# Patient Record
Sex: FEMALE | Race: BLACK OR AFRICAN AMERICAN | NOT HISPANIC OR LATINO | Employment: FULL TIME | URBAN - METROPOLITAN AREA
[De-identification: names, ages, dates, MRNs, and addresses within clinical notes are randomized per-mention and may not be internally consistent; named-entity substitution may affect disease eponyms.]

---

## 2023-12-05 ENCOUNTER — OFFICE VISIT (OUTPATIENT)
Dept: URGENT CARE | Facility: CLINIC | Age: 43
End: 2023-12-05
Payer: COMMERCIAL

## 2023-12-05 VITALS
BODY MASS INDEX: 37.46 KG/M2 | HEART RATE: 107 BPM | SYSTOLIC BLOOD PRESSURE: 132 MMHG | DIASTOLIC BLOOD PRESSURE: 93 MMHG | WEIGHT: 195 LBS | TEMPERATURE: 98.6 F | OXYGEN SATURATION: 97 %

## 2023-12-05 DIAGNOSIS — R11.2 NAUSEA AND VOMITING, UNSPECIFIED VOMITING TYPE: Primary | ICD-10-CM

## 2023-12-05 PROCEDURE — 87636 SARSCOV2 & INF A&B AMP PRB: CPT

## 2023-12-05 PROCEDURE — 99214 OFFICE O/P EST MOD 30 MIN: CPT | Performed by: PHYSICIAN ASSISTANT

## 2023-12-05 RX ORDER — NAPROXEN 500 MG/1
1 TABLET ORAL EVERY 12 HOURS PRN
COMMUNITY
Start: 2022-02-02

## 2023-12-05 RX ORDER — OMEPRAZOLE 20 MG/1
20 CAPSULE, DELAYED RELEASE ORAL DAILY
COMMUNITY

## 2023-12-05 RX ORDER — FLUCONAZOLE 150 MG/1
150 TABLET ORAL
COMMUNITY
Start: 2023-11-13 | End: 2023-12-05 | Stop reason: WASHOUT

## 2023-12-05 RX ORDER — MONTELUKAST SODIUM 10 MG/1
10 TABLET ORAL DAILY
COMMUNITY

## 2023-12-05 RX ORDER — MUPIROCIN 20 MG/G
1 OINTMENT TOPICAL EVERY 12 HOURS
COMMUNITY
Start: 2021-12-10

## 2023-12-05 RX ORDER — PSEUDOEPHEDRINE HCL 30 MG
30 TABLET ORAL
COMMUNITY
Start: 2023-02-19 | End: 2023-12-05 | Stop reason: WASHOUT

## 2023-12-05 RX ORDER — FUROSEMIDE 20 MG/1
20 TABLET ORAL DAILY
COMMUNITY

## 2023-12-05 RX ORDER — PRAVASTATIN SODIUM 10 MG/1
10 TABLET ORAL DAILY
COMMUNITY

## 2023-12-05 RX ORDER — HYDROXYZINE HYDROCHLORIDE 25 MG/1
25 TABLET, FILM COATED ORAL NIGHTLY PRN
COMMUNITY

## 2023-12-05 RX ORDER — DICYCLOMINE HYDROCHLORIDE 20 MG/1
20 TABLET ORAL
Qty: 28 TABLET | Refills: 0 | Status: SHIPPED | OUTPATIENT
Start: 2023-12-05 | End: 2023-12-12

## 2023-12-05 RX ORDER — TERBINAFINE HYDROCHLORIDE 250 MG/1
250 TABLET ORAL DAILY
COMMUNITY
Start: 2023-01-03

## 2023-12-05 RX ORDER — ONDANSETRON 4 MG/1
4 TABLET, FILM COATED ORAL EVERY 8 HOURS PRN
Qty: 21 TABLET | Refills: 0 | Status: SHIPPED | OUTPATIENT
Start: 2023-12-05 | End: 2023-12-12

## 2023-12-05 RX ORDER — POLYMYXIN B SULFATE AND TRIMETHOPRIM 1; 10000 MG/ML; [USP'U]/ML
1 SOLUTION OPHTHALMIC
COMMUNITY
Start: 2023-02-19 | End: 2023-12-05 | Stop reason: WASHOUT

## 2023-12-05 RX ORDER — MECLIZINE HYDROCHLORIDE 25 MG/1
1 TABLET ORAL EVERY 8 HOURS PRN
COMMUNITY
Start: 2023-10-24

## 2023-12-05 RX ORDER — FLUCONAZOLE 150 MG/1
150 TABLET ORAL
COMMUNITY
Start: 2022-08-30 | End: 2023-12-05 | Stop reason: WASHOUT

## 2023-12-05 RX ORDER — FLUTICASONE PROPIONATE 93 UG/1
SPRAY, METERED NASAL
COMMUNITY

## 2023-12-05 RX ORDER — GABAPENTIN 300 MG/1
1 CAPSULE ORAL EVERY 8 HOURS
COMMUNITY

## 2023-12-05 RX ORDER — PREDNISOLONE ACETATE 10 MG/ML
SUSPENSION/ DROPS OPHTHALMIC
COMMUNITY
Start: 2023-07-26 | End: 2023-12-05 | Stop reason: WASHOUT

## 2023-12-05 RX ORDER — GUAIFENESIN, PSEUDOEPHEDRINE HYDROCHLORIDE 600; 60 MG/1; MG/1
TABLET, EXTENDED RELEASE ORAL EVERY 12 HOURS
COMMUNITY
End: 2023-12-05 | Stop reason: WASHOUT

## 2023-12-05 NOTE — PROGRESS NOTES
"Subjective   Patient ID: Sarayh Castro is a 43 y.o. female.    Patient presents with loose stool, nausea, multiple episodes. States that her grandson was over at her house a few days ago and had N/V/D. Was primarily soft stool, but not watery. Her daughter, grandson's mother, has COVID. So she is unsure if her grandson has it -- Sx are different from her daughter's. Daughter is having URI Sx such as fever, chills, body aches, cough, congestion. Pt reports currently being on doxycycline r/t \"cysts.\" Sx have been ongoing x 24 hours for pt. She has had \"watery\" stool, no vomiting yet. States that stool color ranged from \"black to now being green.\" She has had approximately 12-13 loose Bms in the last 24 hours.         The following portions of the chart were reviewed this encounter and updated as appropriate:  Allergies  Meds  Problems  Med Hx  Surg Hx  Fam Hx         Review of Systems   All other systems reviewed and are negative.      Objective     Physical Exam  Constitutional:       General: She is awake.      Appearance: Normal appearance. She is well-developed.   HENT:      Head: Normocephalic and atraumatic.   Cardiovascular:      Rate and Rhythm: Normal rate.   Pulmonary:      Effort: Pulmonary effort is normal.   Abdominal:      General: Abdomen is flat. Bowel sounds are normal. There is distension (very mild).      Palpations: Abdomen is soft.      Tenderness: There is generalized abdominal tenderness. Negative signs include Duque's sign and McBurney's sign.      Hernia: No hernia is present.   Musculoskeletal:      Cervical back: Normal range of motion and neck supple.      Right lower leg: No edema.      Left lower leg: No edema.   Skin:     General: Skin is warm and dry.      Findings: No lesion or rash.   Neurological:      General: No focal deficit present.      Mental Status: She is alert and oriented to person, place, and time.      Cranial Nerves: No facial asymmetry.      Motor: Motor " function is intact.      Gait: Gait is intact.   Psychiatric:         Attention and Perception: Attention normal.         Mood and Affect: Mood and affect normal.         Assessment/Plan   Diagnoses and all orders for this visit:  Nausea and vomiting, unspecified vomiting type  -     Sars-CoV-2 and Influenza A/B PCR  -     ondansetron (Zofran) 4 mg tablet; Take 1 tablet (4 mg) by mouth every 8 hours if needed for nausea or vomiting for up to 7 days.  -     dicyclomine (Bentyl) 20 mg tablet; Take 1 tablet (20 mg) by mouth 4 times a day before meals for 7 days.    Medications Rx for symptoms as above. Tested for COVID and flu per pt request. Offered stool studies - declined at this time since Sx have been ongoing x 24 hours only. Pt will let us know if she changes her mind, but is supposed to see her PCP in 8 days as well and states that she will have her order it if no improvement. If black/tarry or bloody stools, please report to ER.     Patient education provided to patient and documented in AVS. Red flag symptoms reviewed with patient and all questions answered. Patient or parent/guardian verbalized understanding and agreement with care plan as above. All in office testing reviewed with patient. If symptoms worsen or do not improve, patient is to follow up with PCP or report to the ER.

## 2023-12-05 NOTE — PATIENT INSTRUCTIONS
- Increase rest and fluids  - Zofran Rx given  - If any new/concerning symptoms, please call or go to ER   - Take all medications as prescribed  - Begin with BRAT diet (banana, rice, applesauce, toast) and move your way up to a regular diet  - Can hydrate with gatorade/powerade/pedialyte

## 2023-12-05 NOTE — LETTER
December 5, 2023     Patient: Sarahy Castro   YOB: 1980   Date of Visit: 12/5/2023       To Whom It May Concern:    Sarahy Castro was seen in my clinic on 12/5/2023 at 9:50 am. Please excuse Sarahy for her missed work from 12-5-23 until 12-11-23    If you have any questions or concerns, please don't hesitate to call.     [phone]    Sincerely,         Shanita Torres PA-C        CC: No Recipients

## 2023-12-06 LAB
FLUAV RNA RESP QL NAA+PROBE: NOT DETECTED
FLUBV RNA RESP QL NAA+PROBE: NOT DETECTED
SARS-COV-2 RNA RESP QL NAA+PROBE: NOT DETECTED